# Patient Record
Sex: MALE | Race: BLACK OR AFRICAN AMERICAN | NOT HISPANIC OR LATINO | Employment: FULL TIME | URBAN - METROPOLITAN AREA
[De-identification: names, ages, dates, MRNs, and addresses within clinical notes are randomized per-mention and may not be internally consistent; named-entity substitution may affect disease eponyms.]

---

## 2020-10-02 ENCOUNTER — OFFICE VISIT (OUTPATIENT)
Dept: FAMILY MEDICINE CLINIC | Facility: CLINIC | Age: 25
End: 2020-10-02

## 2020-10-02 VITALS
SYSTOLIC BLOOD PRESSURE: 130 MMHG | HEIGHT: 71 IN | TEMPERATURE: 97.5 F | DIASTOLIC BLOOD PRESSURE: 86 MMHG | HEART RATE: 82 BPM | OXYGEN SATURATION: 99 % | WEIGHT: 250.6 LBS | BODY MASS INDEX: 35.08 KG/M2

## 2020-10-02 DIAGNOSIS — B36.0 TINEA VERSICOLOR: Primary | ICD-10-CM

## 2020-10-02 PROCEDURE — 99202 OFFICE O/P NEW SF 15 MIN: CPT | Performed by: FAMILY MEDICINE

## 2020-10-02 PROCEDURE — 90471 IMMUNIZATION ADMIN: CPT | Performed by: FAMILY MEDICINE

## 2020-10-02 PROCEDURE — 90686 IIV4 VACC NO PRSV 0.5 ML IM: CPT | Performed by: FAMILY MEDICINE

## 2020-10-02 RX ORDER — CLOTRIMAZOLE 1 %
CREAM (GRAM) TOPICAL 2 TIMES DAILY
Qty: 60 G | Refills: 5 | Status: SHIPPED | OUTPATIENT
Start: 2020-10-02 | End: 2022-04-19

## 2020-10-02 NOTE — PROGRESS NOTES
"Subjective   Bharathi Funez is a 25 y.o. male.     Chief Complaint   Patient presents with   • Establish Care   • skin discoloration center of chest       History of Present Illness   Has skin hypopigmentation on chest in neck, spreading, for years. Itchy.     Dad thought he had a hearing problem. Has had neg hearing eval. Pt thinks he may just have trouble processing things. Pt worried he has adhd. Has some trouble focusing. Frequently looses things. Does not avoid mental effort. Sometimes wont complete tasks.     The following portions of the patient's history were reviewed and updated as appropriate: allergies, current medications, past family history, past medical history, past social history, past surgical history and problem list.    History reviewed. No pertinent past medical history.    Past Surgical History:   Procedure Laterality Date   • DENTAL TRAUMA REPAIR (TOOTH REIMPLANTATION)         Family History   Problem Relation Age of Onset   • Diabetes Maternal Uncle        Social History     Socioeconomic History   • Marital status: Single     Spouse name: Not on file   • Number of children: Not on file   • Years of education: Not on file   • Highest education level: Not on file   Tobacco Use   • Smoking status: Never Smoker   • Smokeless tobacco: Never Used   Substance and Sexual Activity   • Alcohol use: Never     Frequency: Never   • Drug use: Never       Review of Systems   Constitutional: Negative for fever.   Respiratory: Negative for shortness of breath.        Objective   Visit Vitals  /86   Pulse 82   Temp 97.5 °F (36.4 °C) (Temporal)   Ht 180.3 cm (71\")   Wt 114 kg (250 lb 9.6 oz)   SpO2 99%   BMI 34.95 kg/m²     Body mass index is 34.95 kg/m².  Physical Exam  Constitutional:       Appearance: Normal appearance. He is well-developed.   Cardiovascular:      Rate and Rhythm: Normal rate and regular rhythm.      Heart sounds: Normal heart sounds.   Pulmonary:      Effort: Pulmonary effort is " normal.      Breath sounds: Normal breath sounds.   Musculoskeletal: Normal range of motion.         General: No swelling.   Skin:     General: Skin is warm and dry.      Findings: No rash.   Neurological:      General: No focal deficit present.      Mental Status: He is alert and oriented to person, place, and time.   Psychiatric:         Mood and Affect: Mood normal.         Behavior: Behavior normal.           Assessment/Plan   Bharathi was seen today for establish care and skin discoloration center of chest.    Diagnoses and all orders for this visit:    Tinea versicolor  -     clotrimazole (LOTRIMIN) 1 % cream; Apply  topically to the appropriate area as directed 2 (Two) Times a Day. For 2-4 weeks.    Other orders  -     Fluarix/Fluzone/Afluria Quad>6 Months        F/u if worse or no better. Gave resources for psych appt.

## 2021-06-15 ENCOUNTER — OFFICE VISIT (OUTPATIENT)
Dept: FAMILY MEDICINE CLINIC | Facility: CLINIC | Age: 26
End: 2021-06-15

## 2021-06-15 VITALS
DIASTOLIC BLOOD PRESSURE: 62 MMHG | HEIGHT: 71 IN | SYSTOLIC BLOOD PRESSURE: 104 MMHG | BODY MASS INDEX: 33.63 KG/M2 | WEIGHT: 240.2 LBS | HEART RATE: 78 BPM | TEMPERATURE: 97.8 F | OXYGEN SATURATION: 97 %

## 2021-06-15 DIAGNOSIS — R35.0 URINARY FREQUENCY: Primary | ICD-10-CM

## 2021-06-15 PROCEDURE — 99214 OFFICE O/P EST MOD 30 MIN: CPT | Performed by: FAMILY MEDICINE

## 2021-06-15 NOTE — PROGRESS NOTES
"Subjective   Bharathi Funez is a 25 y.o. male.     Chief Complaint   Patient presents with   • Urinary Frequency   • Abdominal Pain     left side        History of Present Illness   Pt had some mild LUQ pain with gas last week. It is resolved now.     Has had urinary frequency for a year. Does drink a lot of water. Using the restroom every 2-3 hours. Having trouble getting to break time at work. No dyuria. No unprotected sex. No hematuria. Does not have thirst.     Working on weight loss.     The following portions of the patient's history were reviewed and updated as appropriate: allergies, current medications, past family history, past medical history, past social history, past surgical history and problem list.    History reviewed. No pertinent past medical history.    Past Surgical History:   Procedure Laterality Date   • DENTAL TRAUMA REPAIR (TOOTH REIMPLANTATION)         Family History   Problem Relation Age of Onset   • Diabetes Maternal Uncle        Social History     Socioeconomic History   • Marital status: Single     Spouse name: Not on file   • Number of children: Not on file   • Years of education: Not on file   • Highest education level: Not on file   Tobacco Use   • Smoking status: Never Smoker   • Smokeless tobacco: Never Used   Substance and Sexual Activity   • Alcohol use: Never   • Drug use: Never       Review of Systems   Constitutional: Negative for fever.       Objective   Visit Vitals  /62 (BP Location: Left arm, Patient Position: Sitting)   Pulse 78   Temp 97.8 °F (36.6 °C)   Ht 180.3 cm (70.98\")   Wt 109 kg (240 lb 3.2 oz)   SpO2 97%   BMI 33.52 kg/m²     Body mass index is 33.52 kg/m².  Physical Exam  Constitutional:       Appearance: Normal appearance. He is well-developed.   Cardiovascular:      Rate and Rhythm: Normal rate and regular rhythm.      Heart sounds: Normal heart sounds.   Pulmonary:      Effort: Pulmonary effort is normal.      Breath sounds: Normal breath sounds. "   Abdominal:      General: Bowel sounds are normal. There is no distension.      Tenderness: There is no abdominal tenderness. There is no guarding or rebound.   Musculoskeletal:         General: No swelling. Normal range of motion.   Skin:     General: Skin is warm and dry.      Findings: No rash.   Neurological:      General: No focal deficit present.      Mental Status: He is alert and oriented to person, place, and time.   Psychiatric:         Mood and Affect: Mood normal.         Behavior: Behavior normal.           Assessment/Plan   Diagnoses and all orders for this visit:    1. Urinary frequency (Primary)  -     CBC & Differential  -     Comprehensive Metabolic Panel  -     Urinalysis With Microscopic - Urine, Clean Catch  -     Urine Culture - Urine, Urine, Clean Catch  -     Hemoglobin A1c          Will refer based on results.

## 2021-06-17 LAB
ALBUMIN SERPL-MCNC: 4.4 G/DL (ref 3.5–5.2)
ALBUMIN/GLOB SERPL: 1.6 G/DL
ALP SERPL-CCNC: 83 U/L (ref 39–117)
ALT SERPL-CCNC: 18 U/L (ref 1–41)
APPEARANCE UR: CLEAR
AST SERPL-CCNC: 19 U/L (ref 1–40)
BACTERIA #/AREA URNS HPF: NORMAL /HPF
BACTERIA UR CULT: NO GROWTH
BACTERIA UR CULT: NORMAL
BASOPHILS # BLD AUTO: 0.04 10*3/MM3 (ref 0–0.2)
BASOPHILS NFR BLD AUTO: 1.1 % (ref 0–1.5)
BILIRUB SERPL-MCNC: 0.2 MG/DL (ref 0–1.2)
BILIRUB UR QL STRIP: NEGATIVE
BUN SERPL-MCNC: 12 MG/DL (ref 6–20)
BUN/CREAT SERPL: 11.3 (ref 7–25)
CALCIUM SERPL-MCNC: 9.5 MG/DL (ref 8.6–10.5)
CASTS URNS MICRO: NORMAL
CHLORIDE SERPL-SCNC: 102 MMOL/L (ref 98–107)
CO2 SERPL-SCNC: 28.6 MMOL/L (ref 22–29)
COLOR UR: YELLOW
CREAT SERPL-MCNC: 1.06 MG/DL (ref 0.76–1.27)
EOSINOPHIL # BLD AUTO: 0.11 10*3/MM3 (ref 0–0.4)
EOSINOPHIL NFR BLD AUTO: 2.9 % (ref 0.3–6.2)
EPI CELLS #/AREA URNS HPF: NORMAL /HPF
ERYTHROCYTE [DISTWIDTH] IN BLOOD BY AUTOMATED COUNT: 12.7 % (ref 12.3–15.4)
GLOBULIN SER CALC-MCNC: 2.7 GM/DL
GLUCOSE SERPL-MCNC: 78 MG/DL (ref 65–99)
GLUCOSE UR QL: NEGATIVE
HBA1C MFR BLD: 5.1 % (ref 4.8–5.6)
HCT VFR BLD AUTO: 42.5 % (ref 37.5–51)
HGB BLD-MCNC: 14.1 G/DL (ref 13–17.7)
HGB UR QL STRIP: NEGATIVE
IMM GRANULOCYTES # BLD AUTO: 0.01 10*3/MM3 (ref 0–0.05)
IMM GRANULOCYTES NFR BLD AUTO: 0.3 % (ref 0–0.5)
KETONES UR QL STRIP: ABNORMAL
LEUKOCYTE ESTERASE UR QL STRIP: NEGATIVE
LYMPHOCYTES # BLD AUTO: 1.43 10*3/MM3 (ref 0.7–3.1)
LYMPHOCYTES NFR BLD AUTO: 37.8 % (ref 19.6–45.3)
MCH RBC QN AUTO: 28.9 PG (ref 26.6–33)
MCHC RBC AUTO-ENTMCNC: 33.2 G/DL (ref 31.5–35.7)
MCV RBC AUTO: 87.1 FL (ref 79–97)
MONOCYTES # BLD AUTO: 0.4 10*3/MM3 (ref 0.1–0.9)
MONOCYTES NFR BLD AUTO: 10.6 % (ref 5–12)
NEUTROPHILS # BLD AUTO: 1.79 10*3/MM3 (ref 1.7–7)
NEUTROPHILS NFR BLD AUTO: 47.3 % (ref 42.7–76)
NITRITE UR QL STRIP: NEGATIVE
NRBC BLD AUTO-RTO: 0 /100 WBC (ref 0–0.2)
PH UR STRIP: 6 [PH] (ref 5–8)
PLATELET # BLD AUTO: 298 10*3/MM3 (ref 140–450)
POTASSIUM SERPL-SCNC: 4.5 MMOL/L (ref 3.5–5.2)
PROT SERPL-MCNC: 7.1 G/DL (ref 6–8.5)
PROT UR QL STRIP: NEGATIVE
RBC # BLD AUTO: 4.88 10*6/MM3 (ref 4.14–5.8)
RBC #/AREA URNS HPF: NORMAL /HPF
SODIUM SERPL-SCNC: 140 MMOL/L (ref 136–145)
SP GR UR: 1.03 (ref 1–1.03)
UROBILINOGEN UR STRIP-MCNC: ABNORMAL MG/DL
WBC # BLD AUTO: 3.78 10*3/MM3 (ref 3.4–10.8)
WBC #/AREA URNS HPF: NORMAL /HPF

## 2021-06-18 DIAGNOSIS — R35.0 URINARY FREQUENCY: Primary | ICD-10-CM

## 2021-12-06 ENCOUNTER — OFFICE VISIT (OUTPATIENT)
Dept: FAMILY MEDICINE CLINIC | Facility: CLINIC | Age: 26
End: 2021-12-06

## 2021-12-06 VITALS
HEIGHT: 71 IN | BODY MASS INDEX: 29.93 KG/M2 | DIASTOLIC BLOOD PRESSURE: 80 MMHG | TEMPERATURE: 97.1 F | OXYGEN SATURATION: 95 % | WEIGHT: 213.8 LBS | HEART RATE: 78 BPM | SYSTOLIC BLOOD PRESSURE: 110 MMHG

## 2021-12-06 DIAGNOSIS — Z11.59 ENCOUNTER FOR HEPATITIS C SCREENING TEST FOR LOW RISK PATIENT: ICD-10-CM

## 2021-12-06 DIAGNOSIS — Z23 IMMUNIZATION DUE: ICD-10-CM

## 2021-12-06 DIAGNOSIS — R20.0 NUMBNESS AROUND MOUTH: ICD-10-CM

## 2021-12-06 DIAGNOSIS — Z00.00 HEALTHCARE MAINTENANCE: Primary | ICD-10-CM

## 2021-12-06 DIAGNOSIS — B37.0 THRUSH: ICD-10-CM

## 2021-12-06 PROCEDURE — 90471 IMMUNIZATION ADMIN: CPT | Performed by: FAMILY MEDICINE

## 2021-12-06 PROCEDURE — 99395 PREV VISIT EST AGE 18-39: CPT | Performed by: FAMILY MEDICINE

## 2021-12-06 PROCEDURE — 90715 TDAP VACCINE 7 YRS/> IM: CPT | Performed by: FAMILY MEDICINE

## 2021-12-06 RX ORDER — OMEPRAZOLE 20 MG/1
20 TABLET, DELAYED RELEASE ORAL DAILY
COMMUNITY
Start: 2021-10-30 | End: 2022-01-19

## 2021-12-06 NOTE — PROGRESS NOTES
Bharathi Funez is here today for an annual physical exam.     Pt had tingling in mouth after pulling a staple out of his cough with his teeth. He went to the dentist and was told his teeth were ok. Went to [facility] and and they told him it was gerd and gave him a PPI and this helped some. It then went away until he bit another staple to pull it out again. Then his symptoms returned. Went to urgent dental and told him his xrays were ok. Still having some intermittent numbness/burning. Has an appointment with omfs pending.     Eating a healthy diet. Exercising routinely.  Trying to loose weight and has lost 27 pounds!  Sexual and gender orientation: heterosexual male  Practicing safe sex?:yes    PHQ-2 Depression Screening  Little interest or pleasure in doing things? 0   Feeling down, depressed, or hopeless? 0   PHQ-2 Total Score 0        I have reviewed the patient's medical, family, and social history in detail and updated the computerized patient record.    Screening history:  Colonoscopy - not yet due  Prostate - no symptoms  Metabolic - due    Health Maintenance   Topic Date Due   • ANNUAL PHYSICAL  Never done   • TDAP/TD VACCINES (1 - Tdap) Never done   • HEPATITIS C SCREENING  Never done   • INFLUENZA VACCINE  08/01/2021   • COVID-19 Vaccine  Completed   • Pneumococcal Vaccine 0-64  Aged Out       Review of Systems   Constitutional: Negative for fever.   HENT: Negative for hearing loss.    Eyes: Negative for visual disturbance.   Respiratory: Negative for shortness of breath.    Cardiovascular: Negative for chest pain.   Gastrointestinal: Negative for constipation and diarrhea.   Endocrine: Negative for polyuria.   Genitourinary: Negative for difficulty urinating.   Musculoskeletal: Negative for arthralgias and myalgias.   Skin: Negative for rash.   Neurological: Negative for headaches.   Hematological: Does not bruise/bleed easily.   Psychiatric/Behavioral: Negative for dysphoric mood.       /80 (BP  "Location: Left arm, Patient Position: Sitting)   Pulse 78   Temp 97.1 °F (36.2 °C)   Ht 180.3 cm (70.98\")   Wt 97 kg (213 lb 12.8 oz)   SpO2 95%   BMI 29.83 kg/m²      Physical Exam    Vital signs reviewed.  General appearance: No acute distress  Eyes: conjunctiva clear without erythema; pupils equally round and reactive  ENT: external ears and nose normal; hearing normal, oropharynx clear  Neck: supple; no thyromegaly  CV: normal rate and rhythm; no peripheral edema  Respiratory: normal respiratory effort; lungs clear to auscultation bilaterally  MSK: normal gait and station; no focal joint deformity or swelling  Skin: no rash or wounds; normal turgor  Neuro: cranial nerves 2-12 grossly intact; normal sensation to light touch  Psych: mood and affect normal; recent and remote memory intact    No visits with results within 2 Week(s) from this visit.   Latest known visit with results is:   Office Visit on 06/15/2021   Component Date Value Ref Range Status   • WBC 06/15/2021 3.78  3.40 - 10.80 10*3/mm3 Final   • RBC 06/15/2021 4.88  4.14 - 5.80 10*6/mm3 Final   • Hemoglobin 06/15/2021 14.1  13.0 - 17.7 g/dL Final   • Hematocrit 06/15/2021 42.5  37.5 - 51.0 % Final   • MCV 06/15/2021 87.1  79.0 - 97.0 fL Final   • MCH 06/15/2021 28.9  26.6 - 33.0 pg Final   • MCHC 06/15/2021 33.2  31.5 - 35.7 g/dL Final   • RDW 06/15/2021 12.7  12.3 - 15.4 % Final   • Platelets 06/15/2021 298  140 - 450 10*3/mm3 Final   • Neutrophil Rel % 06/15/2021 47.3  42.7 - 76.0 % Final   • Lymphocyte Rel % 06/15/2021 37.8  19.6 - 45.3 % Final   • Monocyte Rel % 06/15/2021 10.6  5.0 - 12.0 % Final   • Eosinophil Rel % 06/15/2021 2.9  0.3 - 6.2 % Final   • Basophil Rel % 06/15/2021 1.1  0.0 - 1.5 % Final   • Neutrophils Absolute 06/15/2021 1.79  1.70 - 7.00 10*3/mm3 Final   • Lymphocytes Absolute 06/15/2021 1.43  0.70 - 3.10 10*3/mm3 Final   • Monocytes Absolute 06/15/2021 0.40  0.10 - 0.90 10*3/mm3 Final   • Eosinophils Absolute 06/15/2021 " 0.11  0.00 - 0.40 10*3/mm3 Final   • Basophils Absolute 06/15/2021 0.04  0.00 - 0.20 10*3/mm3 Final   • Immature Granulocyte Rel % 06/15/2021 0.3  0.0 - 0.5 % Final   • Immature Grans Absolute 06/15/2021 0.01  0.00 - 0.05 10*3/mm3 Final   • nRBC 06/15/2021 0.0  0.0 - 0.2 /100 WBC Final   • Glucose 06/15/2021 78  65 - 99 mg/dL Final   • BUN 06/15/2021 12  6 - 20 mg/dL Final   • Creatinine 06/15/2021 1.06  0.76 - 1.27 mg/dL Final   • eGFR Non  Am 06/15/2021 84  >60 mL/min/1.73 Final    Comment: GFR Normal >60  Chronic Kidney Disease <60  Kidney Failure <15     • eGFR  Am 06/15/2021 102  >60 mL/min/1.73 Final   • BUN/Creatinine Ratio 06/15/2021 11.3  7.0 - 25.0 Final   • Sodium 06/15/2021 140  136 - 145 mmol/L Final   • Potassium 06/15/2021 4.5  3.5 - 5.2 mmol/L Final   • Chloride 06/15/2021 102  98 - 107 mmol/L Final   • Total CO2 06/15/2021 28.6  22.0 - 29.0 mmol/L Final   • Calcium 06/15/2021 9.5  8.6 - 10.5 mg/dL Final   • Total Protein 06/15/2021 7.1  6.0 - 8.5 g/dL Final   • Albumin 06/15/2021 4.40  3.50 - 5.20 g/dL Final   • Globulin 06/15/2021 2.7  gm/dL Final   • A/G Ratio 06/15/2021 1.6  g/dL Final   • Total Bilirubin 06/15/2021 0.2  0.0 - 1.2 mg/dL Final   • Alkaline Phosphatase 06/15/2021 83  39 - 117 U/L Final   • AST (SGOT) 06/15/2021 19  1 - 40 U/L Final   • ALT (SGPT) 06/15/2021 18  1 - 41 U/L Final   • Specific Gravity, UA 06/15/2021 1.026  1.005 - 1.030 Final   • pH, UA 06/15/2021 6.0  5.0 - 8.0 Final   • Color, UA 06/15/2021 Yellow   Final    REFERENCE RANGE: Yellow, Straw   • Appearance, UA 06/15/2021 Clear  Clear Final   • Leukocytes, UA 06/15/2021 Negative  Negative Final   • Protein 06/15/2021 Negative  Negative Final   • Glucose, UA 06/15/2021 Negative  Negative Final   • Ketones 06/15/2021 Trace* Negative Final   • Blood, UA 06/15/2021 Negative  Negative Final   • Bilirubin, UA 06/15/2021 Negative  Negative Final   • Urobilinogen, UA 06/15/2021 Comment   Final    Comment: 0.2  E.U./dL  REFERENCE RANGE: 0.2 - 1.0 E.U./dL     • Nitrite, UA 06/15/2021 Negative  Negative Final   • Urine Culture 06/15/2021 Final report   Final   • Result 1 06/15/2021 No growth   Final   • Hemoglobin A1C 06/15/2021 5.10  4.80 - 5.60 % Final    Comment: Hemoglobin A1C Ranges:  Increased Risk for Diabetes  5.7% to 6.4%  Diabetes                     >= 6.5%  Diabetic Goal                < 7.0%     • WBC, UA 06/15/2021 0-2  /HPF Final    REFERENCE RANGE: None Seen, 0-2   • RBC, UA 06/15/2021 0-2  /HPF Final    REFERENCE RANGE: None Seen, 0-2   • Epithelial Cells (non renal) 06/15/2021 0-2  /HPF Final    REFERENCE RANGE: None Seen, 0-2   • Cast Type 06/15/2021 Comment   Final    HYALINE CASTS, UA            0-2              /LPF       None Seen  N   • Bacteria, UA 06/15/2021 Comment  None Seen /HPF Final    None Seen          Current Outpatient Medications:   •  clotrimazole (LOTRIMIN) 1 % cream, Apply  topically to the appropriate area as directed 2 (Two) Times a Day. For 2-4 weeks., Disp: 60 g, Rfl: 5  •  nystatin (MYCOSTATIN) 100,000 unit/mL suspension, Swish and swallow 5 mL 4 (Four) Times a Day., Disp: 200 mL, Rfl: 0  •  omeprazole OTC (PriLOSEC OTC) 20 MG EC tablet, Take 20 mg by mouth Daily., Disp: , Rfl:     Diagnoses and all orders for this visit:    1. Healthcare maintenance (Primary)  -     Comprehensive Metabolic Panel  -     Lipid Panel  -     Hemoglobin A1c    2. Numbness around mouth  -     Vitamin B12  -     TSH Rfx On Abnormal To Free T4  -     Hemoglobin A1c    3. Encounter for hepatitis C screening test for low risk patient  -     HCV Antibody Reflex To [facility]    4. Immunization due  -     Tdap Vaccine Greater Than or Equal To 6yo IM    5. Thrush  -     nystatin (MYCOSTATIN) 100,000 unit/mL suspension; Swish and swallow 5 mL 4 (Four) Times a Day.  Dispense: 200 mL; Refill: 0        Encourage healthy diet and exercise.  Encourage patient to stay up to date on screening examinations as indicated based  on age and risk factors. F/U yearly.     Patient did not have any obvious signs of thrush in his mouth but his symptoms could be consistent so we will go ahead and treat and follow-up with [facility].

## 2021-12-07 LAB
ALBUMIN SERPL-MCNC: 4.6 G/DL (ref 4.1–5.2)
ALBUMIN/GLOB SERPL: 1.8 {RATIO} (ref 1.2–2.2)
ALP SERPL-CCNC: 65 IU/L (ref 44–121)
ALT SERPL-CCNC: 17 IU/L (ref 0–44)
AST SERPL-CCNC: 17 IU/L (ref 0–40)
BILIRUB SERPL-MCNC: 0.6 MG/DL (ref 0–1.2)
BUN SERPL-MCNC: 9 MG/DL (ref 6–20)
BUN/CREAT SERPL: 8 (ref 9–20)
CALCIUM SERPL-MCNC: 10.4 MG/DL (ref 8.7–10.2)
CHLORIDE SERPL-SCNC: 99 MMOL/L (ref 96–106)
CHOLEST SERPL-MCNC: 121 MG/DL (ref 100–199)
CO2 SERPL-SCNC: 26 MMOL/L (ref 20–29)
CREAT SERPL-MCNC: 1.11 MG/DL (ref 0.76–1.27)
GLOBULIN SER CALC-MCNC: 2.6 G/DL (ref 1.5–4.5)
GLUCOSE SERPL-MCNC: 76 MG/DL (ref 65–99)
HBA1C MFR BLD: 5 % (ref 4.8–5.6)
HCV AB S/CO SERPL IA: <0.1 S/CO RATIO (ref 0–0.9)
HCV AB SERPL QL IA: NORMAL
HDLC SERPL-MCNC: 39 MG/DL
LDLC SERPL CALC-MCNC: 70 MG/DL (ref 0–99)
POTASSIUM SERPL-SCNC: 4.3 MMOL/L (ref 3.5–5.2)
PROT SERPL-MCNC: 7.2 G/DL (ref 6–8.5)
SODIUM SERPL-SCNC: 138 MMOL/L (ref 134–144)
TRIGL SERPL-MCNC: 55 MG/DL (ref 0–149)
TSH SERPL DL<=0.005 MIU/L-ACNC: 2.96 UIU/ML (ref 0.45–4.5)
VIT B12 SERPL-MCNC: 1587 PG/ML (ref 232–1245)
VLDLC SERPL CALC-MCNC: 12 MG/DL (ref 5–40)

## 2022-01-11 ENCOUNTER — TELEPHONE (OUTPATIENT)
Dept: FAMILY MEDICINE CLINIC | Facility: CLINIC | Age: 27
End: 2022-01-11

## 2022-01-11 NOTE — TELEPHONE ENCOUNTER
Caller: Bharathi Funez    Relationship: Self    Best call back number: [phone]    What is the medical concern/diagnosis: HEART BURN, DIGESTIVE ISSUES    What specialty or service is being requested: GASTRO        Any additional details: PATIENT IS SURE WHERE HE WANTS TO GO. HE JUST WANTS TO RULE OUT ANYTHING WITH HIS ACID REFLUX

## 2022-01-19 ENCOUNTER — OFFICE VISIT (OUTPATIENT)
Dept: FAMILY MEDICINE CLINIC | Facility: CLINIC | Age: 27
End: 2022-01-19

## 2022-01-19 VITALS
TEMPERATURE: 98.7 F | HEIGHT: 71 IN | HEART RATE: 79 BPM | BODY MASS INDEX: 29.83 KG/M2 | DIASTOLIC BLOOD PRESSURE: 86 MMHG | SYSTOLIC BLOOD PRESSURE: 118 MMHG | OXYGEN SATURATION: 98 %

## 2022-01-19 DIAGNOSIS — K21.9 GASTROESOPHAGEAL REFLUX DISEASE WITHOUT ESOPHAGITIS: Primary | ICD-10-CM

## 2022-01-19 DIAGNOSIS — R10.13 EPIGASTRIC PAIN: ICD-10-CM

## 2022-01-19 PROCEDURE — 99214 OFFICE O/P EST MOD 30 MIN: CPT | Performed by: FAMILY MEDICINE

## 2022-01-19 RX ORDER — OMEPRAZOLE 40 MG/1
40 CAPSULE, DELAYED RELEASE ORAL DAILY
Qty: 30 CAPSULE | Refills: 5 | Status: SHIPPED | OUTPATIENT
Start: 2022-01-19

## 2022-01-19 NOTE — PROGRESS NOTES
"Subjective   Bharathi Funez is a 26 y.o. male.     Chief Complaint   Patient presents with   • referral     GI       History of Present Illness   nausea for 2 weeks, epigastric discomfort and burning. Having reflux symptoms. Having sour taste in mouth and belching. Is taking his ppi. Asking for GI referral. Spicy food makes it worse. Unsure about fatty foods. Burning mouth has resolved.     The following portions of the patient's history were reviewed and updated as appropriate: allergies, current medications, past family history, past medical history, past social history, past surgical history and problem list.    History reviewed. No pertinent past medical history.    Past Surgical History:   Procedure Laterality Date   • DENTAL TRAUMA REPAIR (TOOTH REIMPLANTATION)         Family History   Problem Relation Age of Onset   • Diabetes Maternal Uncle        Social History     Socioeconomic History   • Marital status: Single   Tobacco Use   • Smoking status: Never Smoker   • Smokeless tobacco: Never Used   Substance and Sexual Activity   • Alcohol use: Never   • Drug use: Never       Review of Systems   Constitutional: Negative for fever.   Gastrointestinal: Positive for GERD and indigestion. Negative for anal bleeding, blood in stool, constipation and vomiting.       Objective   Visit Vitals  /86 (BP Location: Left arm, Patient Position: Sitting)   Pulse 79   Temp 98.7 °F (37.1 °C)   Ht 180.3 cm (70.98\")   SpO2 98%   BMI 29.83 kg/m²     Body mass index is 29.83 kg/m².  Physical Exam  Constitutional:       Appearance: Normal appearance. He is well-developed.   Cardiovascular:      Rate and Rhythm: Normal rate and regular rhythm.      Heart sounds: Normal heart sounds.   Pulmonary:      Effort: Pulmonary effort is normal.      Breath sounds: Normal breath sounds.   Abdominal:      General: Abdomen is flat. Bowel sounds are normal. There is no distension.      Tenderness: There is no abdominal tenderness. There is " no guarding.   Musculoskeletal:         General: No swelling. Normal range of motion.   Skin:     General: Skin is warm and dry.      Findings: No rash.   Neurological:      General: No focal deficit present.      Mental Status: He is alert and oriented to person, place, and time.   Psychiatric:         Mood and Affect: Mood normal.         Behavior: Behavior normal.           Assessment/Plan   Diagnoses and all orders for this visit:    1. Gastroesophageal reflux disease without esophagitis (Primary)  -     Ambulatory Referral to Gastroenterology  -     omeprazole (priLOSEC) 40 MG capsule; Take 1 capsule by mouth Daily.  Dispense: 30 capsule; Refill: 5    2. Epigastric pain  -     CBC & Differential  -     Comprehensive Metabolic Panel  -     Amylase  -     Lipase        F/U if worse or no better.

## 2022-01-20 LAB
ALBUMIN SERPL-MCNC: 4.5 G/DL (ref 4.1–5.2)
ALBUMIN/GLOB SERPL: 1.5 {RATIO} (ref 1.2–2.2)
ALP SERPL-CCNC: 58 IU/L (ref 44–121)
ALT SERPL-CCNC: 12 IU/L (ref 0–44)
AMYLASE SERPL-CCNC: 55 U/L (ref 31–110)
AST SERPL-CCNC: 15 IU/L (ref 0–40)
BASOPHILS # BLD AUTO: 0.1 X10E3/UL (ref 0–0.2)
BASOPHILS NFR BLD AUTO: 2 %
BILIRUB SERPL-MCNC: 0.6 MG/DL (ref 0–1.2)
BUN SERPL-MCNC: 14 MG/DL (ref 6–20)
BUN/CREAT SERPL: 12 (ref 9–20)
CALCIUM SERPL-MCNC: 10.2 MG/DL (ref 8.7–10.2)
CHLORIDE SERPL-SCNC: 99 MMOL/L (ref 96–106)
CO2 SERPL-SCNC: 27 MMOL/L (ref 20–29)
CREAT SERPL-MCNC: 1.21 MG/DL (ref 0.76–1.27)
EOSINOPHIL # BLD AUTO: 0.2 X10E3/UL (ref 0–0.4)
EOSINOPHIL NFR BLD AUTO: 4 %
ERYTHROCYTE [DISTWIDTH] IN BLOOD BY AUTOMATED COUNT: 12.7 % (ref 11.6–15.4)
GLOBULIN SER CALC-MCNC: 3 G/DL (ref 1.5–4.5)
GLUCOSE SERPL-MCNC: 83 MG/DL (ref 65–99)
HCT VFR BLD AUTO: 43.8 % (ref 37.5–51)
HGB BLD-MCNC: 14.5 G/DL (ref 13–17.7)
IMM GRANULOCYTES # BLD AUTO: 0 X10E3/UL (ref 0–0.1)
IMM GRANULOCYTES NFR BLD AUTO: 0 %
LIPASE SERPL-CCNC: 26 U/L (ref 13–78)
LYMPHOCYTES # BLD AUTO: 1.5 X10E3/UL (ref 0.7–3.1)
LYMPHOCYTES NFR BLD AUTO: 45 %
MCH RBC QN AUTO: 28.9 PG (ref 26.6–33)
MCHC RBC AUTO-ENTMCNC: 33.1 G/DL (ref 31.5–35.7)
MCV RBC AUTO: 87 FL (ref 79–97)
MONOCYTES # BLD AUTO: 0.4 X10E3/UL (ref 0.1–0.9)
MONOCYTES NFR BLD AUTO: 11 %
NEUTROPHILS # BLD AUTO: 1.3 X10E3/UL (ref 1.4–7)
NEUTROPHILS NFR BLD AUTO: 38 %
PLATELET # BLD AUTO: 339 X10E3/UL (ref 150–450)
POTASSIUM SERPL-SCNC: 4.1 MMOL/L (ref 3.5–5.2)
PROT SERPL-MCNC: 7.5 G/DL (ref 6–8.5)
RBC # BLD AUTO: 5.02 X10E6/UL (ref 4.14–5.8)
SODIUM SERPL-SCNC: 140 MMOL/L (ref 134–144)
WBC # BLD AUTO: 3.4 X10E3/UL (ref 3.4–10.8)

## 2022-03-08 ENCOUNTER — PREP FOR SURGERY (OUTPATIENT)
Dept: SURGERY | Facility: SURGERY CENTER | Age: 27
End: 2022-03-08

## 2022-03-08 ENCOUNTER — OFFICE VISIT (OUTPATIENT)
Dept: GASTROENTEROLOGY | Facility: CLINIC | Age: 27
End: 2022-03-08

## 2022-03-08 VITALS
BODY MASS INDEX: 27.48 KG/M2 | HEART RATE: 73 BPM | OXYGEN SATURATION: 98 % | DIASTOLIC BLOOD PRESSURE: 74 MMHG | TEMPERATURE: 96.6 F | HEIGHT: 71 IN | WEIGHT: 196.3 LBS | SYSTOLIC BLOOD PRESSURE: 128 MMHG

## 2022-03-08 DIAGNOSIS — R10.13 EPIGASTRIC PAIN: ICD-10-CM

## 2022-03-08 DIAGNOSIS — R11.0 NAUSEA: ICD-10-CM

## 2022-03-08 DIAGNOSIS — K21.9 GASTROESOPHAGEAL REFLUX DISEASE, UNSPECIFIED WHETHER ESOPHAGITIS PRESENT: Primary | ICD-10-CM

## 2022-03-08 DIAGNOSIS — R19.7 DIARRHEA, UNSPECIFIED TYPE: ICD-10-CM

## 2022-03-08 PROCEDURE — 99204 OFFICE O/P NEW MOD 45 MIN: CPT | Performed by: INTERNAL MEDICINE

## 2022-03-08 RX ORDER — SODIUM CHLORIDE, SODIUM LACTATE, POTASSIUM CHLORIDE, CALCIUM CHLORIDE 600; 310; 30; 20 MG/100ML; MG/100ML; MG/100ML; MG/100ML
30 INJECTION, SOLUTION INTRAVENOUS CONTINUOUS PRN
Status: CANCELLED | OUTPATIENT
Start: 2022-03-08

## 2022-03-08 RX ORDER — SODIUM CHLORIDE 0.9 % (FLUSH) 0.9 %
3 SYRINGE (ML) INJECTION EVERY 12 HOURS SCHEDULED
Status: CANCELLED | OUTPATIENT
Start: 2022-03-08

## 2022-03-08 RX ORDER — SODIUM CHLORIDE 0.9 % (FLUSH) 0.9 %
10 SYRINGE (ML) INJECTION AS NEEDED
Status: CANCELLED | OUTPATIENT
Start: 2022-03-08

## 2022-03-08 NOTE — PROGRESS NOTES
"Chief Complaint   Patient presents with   • Abdominal Pain   • Diarrhea           History of Present Illness  Patient today for consultation regarding chronic and worsening GERD.  He currently takes Prilosec 40 mg daily.  Is also having nausea and epigastric burning pain.    Pain and heat is present in upper chest abdomen, right and left side, comes and goes.     He thought symptoms were initially related to his mouth/dental issues. He had heat sensation in the back of his throat. He has nausea, no vomiting.     Prescription strength omeprazole has been working better than OTC dose.     Periods of diarrhea last 2 weeks, come and go then return to normal.     No family history of inflammatory bowel disease.     He has had some Weight loss unsure if related to recent dental issues.        Result Review :       Office Visit with Lois Bishop MD (01/19/2022)  SCANNED - IMAGING (07/16/2021)  Comprehensive Metabolic Panel (01/19/2022 10:55)  CBC & Differential (01/19/2022 10:55)      Vital Signs:   /74   Pulse 73   Temp 96.6 °F (35.9 °C)   Ht 180.3 cm (71\")   Wt 89 kg (196 lb 4.8 oz)   SpO2 98%   BMI 27.38 kg/m²     Body mass index is 27.38 kg/m².     Physical Exam  Vitals reviewed.   Constitutional:       Appearance: Normal appearance.   Abdominal:      General: Bowel sounds are normal. There is no distension.      Palpations: Abdomen is soft. Abdomen is not rigid. There is no mass or pulsatile mass.      Tenderness: There is no abdominal tenderness. There is no guarding or rebound.           Assessment and Plan    Diagnoses and all orders for this visit:    1. Gastroesophageal reflux disease, unspecified whether esophagitis present (Primary)    2. Epigastric pain    3. Nausea    4. Diarrhea, unspecified type         BRIEF SUMMARY  Patient for consultation.  He complains of new and unexplained epigastric pain with worsening reflux.  He also has had nonspecified nausea.  We will proceed with an EGD for " further evaluation.  He also has had unexplained new diarrhea and we will proceed with colonoscopy to rule out inflammatory bowel issues.    I have reviewed and confirmed the accuracy of the HPI and Assessment and Plan as documented by the MO[facility]        Follow Up   No follow-ups on file.    Patient Instructions   Continue omeprazole    For GERD, follow antireflux precautions.  Recommend avoiding eating within 3 to 4 hours of bedtime.  Avoid foods that can trigger symptoms which may include citrus fruits, spicy foods, tomatoes and red sauces, chocolate, coffee/tea, caffeinated or carbonated beverages, alcohol.     Schedule EGD and colonoscopy, orders placed.    Additional recommendations will be made based on results of EGD and colonoscopy findings.    Follow-up visit after procedures to discuss results and make any additional recommendations.

## 2022-03-08 NOTE — PATIENT INSTRUCTIONS
Continue omeprazole    For GERD, follow antireflux precautions.  Recommend avoiding eating within 3 to 4 hours of bedtime.  Avoid foods that can trigger symptoms which may include citrus fruits, spicy foods, tomatoes and red sauces, chocolate, coffee/tea, caffeinated or carbonated beverages, alcohol.     Schedule EGD and colonoscopy, orders placed.    Additional recommendations will be made based on results of EGD and colonoscopy findings.    Follow-up visit after procedures to discuss results and make any additional recommendations.

## 2022-04-19 ENCOUNTER — LAB (OUTPATIENT)
Dept: LAB | Facility: SURGERY CENTER | Age: 27
End: 2022-04-19

## 2022-04-19 DIAGNOSIS — R10.13 EPIGASTRIC PAIN: ICD-10-CM

## 2022-04-19 DIAGNOSIS — R19.7 DIARRHEA, UNSPECIFIED TYPE: ICD-10-CM

## 2022-04-19 DIAGNOSIS — R11.0 NAUSEA: ICD-10-CM

## 2022-04-19 DIAGNOSIS — K21.9 GASTROESOPHAGEAL REFLUX DISEASE, UNSPECIFIED WHETHER ESOPHAGITIS PRESENT: ICD-10-CM

## 2022-04-19 LAB — SARS-COV-2 ORF1AB RESP QL NAA+PROBE: NOT DETECTED

## 2022-04-19 PROCEDURE — U0004 COV-19 TEST NON-CDC HGH THRU: HCPCS | Performed by: NURSE PRACTITIONER

## 2022-04-19 PROCEDURE — C9803 HOPD COVID-19 SPEC COLLECT: HCPCS

## 2022-04-19 NOTE — SIGNIFICANT NOTE
Education provided the Patient on the following:    - Nothing to Eat or Drink after MN the night before the procedure  -Your required COVID Test is Scheduled on 4/19/22 Between the Hours of 7080-7544  -You will only be notified if your COVID test Result is POSITIVE  -The importance of reducing your number of contacts by self quarantining after you COVID test until the date of your EGD  -You will need to have someone drive you home after your EGD and remain with you for 24 hours after the EGD  - The date of your EGD, your are welcome to have one visitor at bedside or remain within 10-15 minutes of [place]  -Please wear warm socks when you arrive for your EGD  -Remove all jewelry and leave any valuables before arriving on the date of your procedure (all will have to be removed before leaving preop)  -You will need to arrive at 1130 on 4/21/22 EGD  -Feel free to contact us at: [phone] with any additional questions/concerns       Education provided the Patient on the following:    - Nothing to Eat or Drink after MN the night before the procedure    - Avoid red/purple fluids while completing their bowel prep as ordered by physician  -Contact Gastrointerologist office for any questions about specific details regarding colon prep    -You will need to have someone drive you home after your colonoscopy and remain with you for 24 hours after the procedure  - The date of your Surgery, you may have one visitor at bedside or within 10-15 minutes of [place]  -Please wear warm socks when you arrive for your colonoscopy  -Remove all jewelry and leave any valuables before arriving the day of your procedure (all will have to be removed before leaving preop)  -You will need to arrive at 1130 on 4/21/22 for your colonoscopy    -Feel free to contact us at: [phone] with any additional questions/concerns

## 2022-04-21 ENCOUNTER — ANESTHESIA (OUTPATIENT)
Dept: SURGERY | Facility: SURGERY CENTER | Age: 27
End: 2022-04-21

## 2022-04-21 ENCOUNTER — ANESTHESIA EVENT (OUTPATIENT)
Dept: SURGERY | Facility: SURGERY CENTER | Age: 27
End: 2022-04-21

## 2022-04-21 ENCOUNTER — HOSPITAL ENCOUNTER (OUTPATIENT)
Facility: SURGERY CENTER | Age: 27
Setting detail: HOSPITAL OUTPATIENT SURGERY
Discharge: HOME | End: 2022-04-21
Attending: INTERNAL MEDICINE | Admitting: INTERNAL MEDICINE

## 2022-04-21 VITALS
OXYGEN SATURATION: 100 % | DIASTOLIC BLOOD PRESSURE: 62 MMHG | TEMPERATURE: 98 F | BODY MASS INDEX: 28.42 KG/M2 | SYSTOLIC BLOOD PRESSURE: 109 MMHG | WEIGHT: 203 LBS | RESPIRATION RATE: 16 BRPM | HEART RATE: 69 BPM | HEIGHT: 71 IN

## 2022-04-21 DIAGNOSIS — K21.9 GASTROESOPHAGEAL REFLUX DISEASE, UNSPECIFIED WHETHER ESOPHAGITIS PRESENT: ICD-10-CM

## 2022-04-21 DIAGNOSIS — R11.0 NAUSEA: ICD-10-CM

## 2022-04-21 DIAGNOSIS — R10.13 EPIGASTRIC PAIN: ICD-10-CM

## 2022-04-21 DIAGNOSIS — R19.7 DIARRHEA, UNSPECIFIED TYPE: ICD-10-CM

## 2022-04-21 PROCEDURE — 45380 COLONOSCOPY AND BIOPSY: CPT | Performed by: INTERNAL MEDICINE

## 2022-04-21 PROCEDURE — 88305 TISSUE EXAM BY PATHOLOGIST: CPT | Performed by: INTERNAL MEDICINE

## 2022-04-21 PROCEDURE — 43239 EGD BIOPSY SINGLE/MULTIPLE: CPT | Performed by: INTERNAL MEDICINE

## 2022-04-21 PROCEDURE — 25010000002 PROPOFOL 10 MG/ML EMULSION: Performed by: ANESTHESIOLOGY

## 2022-04-21 RX ORDER — LIDOCAINE HYDROCHLORIDE 10 MG/ML
0.5 INJECTION, SOLUTION INFILTRATION; PERINEURAL ONCE AS NEEDED
Status: DISCONTINUED | OUTPATIENT
Start: 2022-04-21 | End: 2022-04-21 | Stop reason: HOSPADM

## 2022-04-21 RX ORDER — LIDOCAINE HYDROCHLORIDE 20 MG/ML
INJECTION, SOLUTION INFILTRATION; PERINEURAL AS NEEDED
Status: DISCONTINUED | OUTPATIENT
Start: 2022-04-21 | End: 2022-04-21 | Stop reason: SURG

## 2022-04-21 RX ORDER — MAGNESIUM HYDROXIDE 1200 MG/15ML
LIQUID ORAL AS NEEDED
Status: DISCONTINUED | OUTPATIENT
Start: 2022-04-21 | End: 2022-04-21 | Stop reason: HOSPADM

## 2022-04-21 RX ORDER — SODIUM CHLORIDE 0.9 % (FLUSH) 0.9 %
10 SYRINGE (ML) INJECTION AS NEEDED
Status: DISCONTINUED | OUTPATIENT
Start: 2022-04-21 | End: 2022-04-21 | Stop reason: HOSPADM

## 2022-04-21 RX ORDER — SODIUM CHLORIDE, SODIUM LACTATE, POTASSIUM CHLORIDE, CALCIUM CHLORIDE 600; 310; 30; 20 MG/100ML; MG/100ML; MG/100ML; MG/100ML
1000 INJECTION, SOLUTION INTRAVENOUS CONTINUOUS
Status: DISCONTINUED | OUTPATIENT
Start: 2022-04-21 | End: 2022-04-21 | Stop reason: HOSPADM

## 2022-04-21 RX ORDER — PROPOFOL 10 MG/ML
VIAL (ML) INTRAVENOUS AS NEEDED
Status: DISCONTINUED | OUTPATIENT
Start: 2022-04-21 | End: 2022-04-21 | Stop reason: SURG

## 2022-04-21 RX ORDER — SODIUM CHLORIDE 0.9 % (FLUSH) 0.9 %
3 SYRINGE (ML) INJECTION EVERY 12 HOURS SCHEDULED
Status: DISCONTINUED | OUTPATIENT
Start: 2022-04-21 | End: 2022-04-21 | Stop reason: HOSPADM

## 2022-04-21 RX ADMIN — SODIUM CHLORIDE, POTASSIUM CHLORIDE, SODIUM LACTATE AND CALCIUM CHLORIDE 1000 ML: 600; 310; 30; 20 INJECTION, SOLUTION INTRAVENOUS at 12:01

## 2022-04-21 RX ADMIN — LIDOCAINE HYDROCHLORIDE 60 MG: 20 INJECTION, SOLUTION INFILTRATION; PERINEURAL at 13:28

## 2022-04-21 RX ADMIN — PROPOFOL 100 MG: 10 INJECTION, EMULSION INTRAVENOUS at 13:28

## 2022-04-21 RX ADMIN — PROPOFOL INJECTABLE EMULSION 160 MCG/KG/MIN: 10 INJECTION, EMULSION INTRAVENOUS at 13:28

## 2022-04-21 NOTE — ANESTHESIA POSTPROCEDURE EVALUATION
"Patient: Derek Funez    Procedure Summary     Date: 04/21/22 Room / Location: [facility]    Anesthesia Start: 1326 Anesthesia Stop: 1352    Procedures:       ESOPHAGOGASTRODUODENOSCOPY (N/A )      COLONOSCOPY (N/A ) Diagnosis:       Gastroesophageal reflux disease, unspecified whether esophagitis present      Epigastric pain      Nausea      Diarrhea, unspecified type      (Gastroesophageal reflux disease, unspecified whether esophagitis present [K21.9])      (Epigastric pain [R10.13])      (Nausea [R11.0])      (Diarrhea, unspecified type [R19.7])    Surgeons: Ketan Shea MD Provider: Sukhi Herzog MD    Anesthesia Type: MAC ASA Status: 2          Anesthesia Type: MAC    Vitals  Vitals Value Taken Time   /60 04/21/22 1351   Temp 36.7 °C (98 °F) 04/21/22 1351   Pulse 71 04/21/22 1351   Resp 16 04/21/22 1351   SpO2 99 % 04/21/22 1351           Post Anesthesia Care and Evaluation    Patient location during evaluation: bedside  Patient participation: complete - patient participated  Level of consciousness: awake and alert  Pain management: adequate  Airway patency: patent  Anesthetic complications: No anesthetic complications    Cardiovascular status: acceptable  Respiratory status: acceptable  Hydration status: acceptable    Comments: /60   Pulse 71   Temp 36.7 °C (98 °F)   Resp 16   Ht 180.3 cm (71\")   Wt 92.1 kg (203 lb)   SpO2 99%   BMI 28.31 kg/m²       "

## 2022-04-21 NOTE — H&P
No chief complaint on file.      HPI  Patient today for an EGD due to GERD and epigastric pain and nausea and a colonoscopy for diarrhea.  He does have a family history of inflammatory bowel disease.         Problem List:    Patient Active Problem List   Diagnosis   • Tinea versicolor   • Urinary frequency   • Healthcare maintenance   • Numbness around mouth   • Thrush   • Gastroesophageal reflux disease   • Epigastric pain   • Nausea   • Diarrhea       Medical History:  History reviewed. No pertinent past medical history.     Social History:    Social History     Socioeconomic History   • Marital status: Single   Tobacco Use   • Smoking status: Never Smoker   • Smokeless tobacco: Never Used   Vaping Use   • Vaping Use: Never used   Substance and Sexual Activity   • Alcohol use: Never   • Drug use: Never       Family History:   Family History   Problem Relation Age of Onset   • Diabetes Maternal Uncle    • Stroke Maternal Uncle    • Inflammatory bowel disease Paternal Grandfather    • Crohn's disease Paternal Grandfather    • Colon polyps Paternal Grandfather    • Celiac disease Paternal Grandfather        Surgical History:   Past Surgical History:   Procedure Laterality Date   • DENTAL TRAUMA REPAIR (TOOTH REIMPLANTATION)           Current Facility-Administered Medications:   •  lactated ringers infusion 1,000 mL, 1,000 mL, Intravenous, Continuous, Ketan Shea MD  •  lidocaine (XYLOCAINE) 1 % injection 0.5 mL, 0.5 mL, Intradermal, Once PRN, Ketan Shea MD  •  sodium chloride 0.9 % flush 10 mL, 10 mL, Intravenous, PRN, Susanna Eisenberg APRN  •  sodium chloride 0.9 % flush 3 mL, 3 mL, Intravenous, Q12H, Susanna Eisenberg APRN    Allergies: No Known Allergies     The following portions of the patient's history were reviewed by me and updated as appropriate: review of systems, allergies, current medications, past family history, past medical history, past social history, past surgical history and  problem list.    There were no vitals filed for this visit.    PHYSICAL EXAM:    CONSTITUTIONAL:  today's vital signs reviewed by me  GASTROINTESTINAL: abdomen is soft nontender nondistended with normal active bowel sounds, no masses are appreciated    Assessment/ Plan  We will proceed today with EGD and colonoscopy.    Risks and benefits as well as alternatives to endoscopic evaluation were explained to the patient and they voiced understanding and wish to proceed.  These risks include but are not limited to the risk of bleeding, perforation, adverse reaction to sedation, and missed lesions.  The patient was given the opportunity to ask questions prior to the endoscopic procedure.

## 2022-04-21 NOTE — ANESTHESIA PREPROCEDURE EVALUATION
" Anesthesia Evaluation     Patient summary reviewed and Nursing notes reviewed   NPO Solid Status: > 8 hours  NPO Liquid Status: > 2 hours           Airway   Mallampati: II  TM distance: >3 FB  Neck ROM: full  No difficulty expected  Dental      Pulmonary    Cardiovascular         Neuro/Psych  (+) numbness,    GI/Hepatic/Renal/Endo    (+)  GERD,      Musculoskeletal     Abdominal    Substance History      OB/GYN          Other                        Anesthesia Plan    ASA 2     MAC   (I have reviewed the patient's history and chart with the patient, including all pertinent laboratory results and imaging. I have explained the risks of anesthesia including but not limited to dental damage, corneal abrasion, nerve injury, MI, stroke, aspiration, and death. Patient has agreed to proceed.     /74 (BP Location: Left arm, Patient Position: Lying)   Pulse 76   Temp 36.4 °C (97.5 °F) (Temporal)   Resp 16   Ht 180.3 cm (71\")   Wt 92.1 kg (203 lb)   SpO2 98%   BMI 28.31 kg/m²   )  intravenous induction     Anesthetic plan, all risks, benefits, and alternatives have been provided, discussed and informed consent has been obtained with: patient.        CODE STATUS:       "

## 2022-04-25 LAB
LAB AP CASE REPORT: NORMAL
LAB AP CLINICAL INFORMATION: NORMAL
PATH REPORT.FINAL DX SPEC: NORMAL
PATH REPORT.GROSS SPEC: NORMAL

## 2022-05-24 NOTE — PROGRESS NOTES
"Chief Complaint   Patient presents with   • GI Problem           History of Present Illness  Patient is a 26-year-old male who presents today for Follow-up.  He has a history of GERD and diarrhea.  He underwent EGD and colonoscopy April 2022.  EGD was normal.  Colonoscopy was also normal.  Gastric, duodenal, and random colon biopsies were unremarkable.    Patient presents today for follow-up.  He reports overall he is feeling better.  He has had no further nausea or vomiting.  Reflux symptoms have been well controlled.  He denies any abdominal pain.  Diarrhea has resolved and bowels have been moving regularly.     Result Review :       Tissue Pathology Exam (04/21/2022 13:32)   COLONOSCOPY (04/21/2022 13:19)   UPPER GI ENDOSCOPY (04/21/2022 13:20)   Office Visit with Ketan Shea MD (03/08/2022)       Vital Signs:   /76   Pulse 73   Temp 97.5 °F (36.4 °C)   Ht 180.3 cm (71\")   Wt 98.5 kg (217 lb 1.6 oz)   SpO2 96%   BMI 30.28 kg/m²     Body mass index is 30.28 kg/m².     Physical Exam  Vitals reviewed.   Constitutional:       General: He is not in acute distress.     Appearance: He is well-developed.   HENT:      Head: Normocephalic and atraumatic.   Pulmonary:      Effort: Pulmonary effort is normal. No respiratory distress.   Skin:     General: Skin is dry.      Coloration: Skin is not pale.   Neurological:      Mental Status: He is alert and oriented to person, place, and time.   Psychiatric:         Thought Content: Thought content normal.           Assessment and Plan    Diagnoses and all orders for this visit:    1. Gastroesophageal reflux disease, unspecified whether esophagitis present (Primary)    2. Diarrhea, unspecified type    3. Nausea         Discussion  Patient presents today for follow-up after EGD and colonoscopy.  EGD and colonoscopy findings reviewed at today's office visit.  Both endoscopic appearance and biopsies were normal.  Patient reports symptoms have resolved and he is " overall feeling better.  Recommended continued observation and patient instructed to notify the office if symptoms recur.  He may follow-up with our office on an as-needed basis.  He will need a colonoscopy at age 43 for colon cancer screening.          Follow Up   Return if symptoms worsen or fail to improve.       12.8

## 2022-05-26 ENCOUNTER — OFFICE VISIT (OUTPATIENT)
Dept: GASTROENTEROLOGY | Facility: CLINIC | Age: 27
End: 2022-05-26

## 2022-05-26 VITALS
SYSTOLIC BLOOD PRESSURE: 130 MMHG | OXYGEN SATURATION: 96 % | DIASTOLIC BLOOD PRESSURE: 76 MMHG | HEART RATE: 73 BPM | TEMPERATURE: 97.5 F | WEIGHT: 217.1 LBS | BODY MASS INDEX: 30.39 KG/M2 | HEIGHT: 71 IN

## 2022-05-26 DIAGNOSIS — R19.7 DIARRHEA, UNSPECIFIED TYPE: ICD-10-CM

## 2022-05-26 DIAGNOSIS — R11.0 NAUSEA: ICD-10-CM

## 2022-05-26 DIAGNOSIS — K21.9 GASTROESOPHAGEAL REFLUX DISEASE, UNSPECIFIED WHETHER ESOPHAGITIS PRESENT: Primary | ICD-10-CM

## 2022-05-26 PROCEDURE — 99213 OFFICE O/P EST LOW 20 MIN: CPT | Performed by: NURSE PRACTITIONER

## 2025-06-06 ENCOUNTER — APPOINTMENT (OUTPATIENT)
Dept: GENERAL RADIOLOGY | Facility: HOSPITAL | Age: 30
End: 2025-06-06
Payer: COMMERCIAL

## 2025-06-06 ENCOUNTER — APPOINTMENT (OUTPATIENT)
Dept: CT IMAGING | Facility: HOSPITAL | Age: 30
End: 2025-06-06
Payer: COMMERCIAL

## 2025-06-06 ENCOUNTER — HOSPITAL ENCOUNTER (EMERGENCY)
Facility: HOSPITAL | Age: 30
Discharge: HOME | End: 2025-06-06
Attending: EMERGENCY MEDICINE
Payer: COMMERCIAL

## 2025-06-06 VITALS
OXYGEN SATURATION: 99 % | DIASTOLIC BLOOD PRESSURE: 67 MMHG | BODY MASS INDEX: 42 KG/M2 | HEIGHT: 71 IN | TEMPERATURE: 98 F | WEIGHT: 300 LBS | RESPIRATION RATE: 18 BRPM | HEART RATE: 88 BPM | SYSTOLIC BLOOD PRESSURE: 153 MMHG

## 2025-06-06 DIAGNOSIS — R55 VASOVAGAL SYNCOPE: ICD-10-CM

## 2025-06-06 DIAGNOSIS — S02.831A CLOSED FRACTURE OF MEDIAL WALL OF RIGHT ORBIT, INITIAL ENCOUNTER: Primary | ICD-10-CM

## 2025-06-06 LAB
ALBUMIN SERPL-MCNC: 4.1 G/DL (ref 3.5–5.2)
ALBUMIN/GLOB SERPL: 1.3 G/DL
ALP SERPL-CCNC: 75 U/L (ref 39–117)
ALT SERPL W P-5'-P-CCNC: 38 U/L (ref 1–41)
ANION GAP SERPL CALCULATED.3IONS-SCNC: 8.9 MMOL/L (ref 5–15)
AST SERPL-CCNC: 63 U/L (ref 1–40)
BASOPHILS # BLD AUTO: 0.1 10*3/MM3 (ref 0–0.2)
BASOPHILS NFR BLD AUTO: 1.2 % (ref 0–1.5)
BILIRUB SERPL-MCNC: 0.4 MG/DL (ref 0–1.2)
BUN SERPL-MCNC: 14 MG/DL (ref 6–20)
BUN/CREAT SERPL: 10.7 (ref 7–25)
CALCIUM SPEC-SCNC: 9.3 MG/DL (ref 8.6–10.5)
CHLORIDE SERPL-SCNC: 102 MMOL/L (ref 98–107)
CO2 SERPL-SCNC: 28.1 MMOL/L (ref 22–29)
CREAT SERPL-MCNC: 1.31 MG/DL (ref 0.76–1.27)
DEPRECATED RDW RBC AUTO: 40.5 FL (ref 37–54)
EGFRCR SERPLBLD CKD-EPI 2021: 74.6 ML/MIN/1.73
EOSINOPHIL # BLD AUTO: 0.2 10*3/MM3 (ref 0–0.4)
EOSINOPHIL NFR BLD AUTO: 2.5 % (ref 0.3–6.2)
ERYTHROCYTE [DISTWIDTH] IN BLOOD BY AUTOMATED COUNT: 13.1 % (ref 12.3–15.4)
GEN 5 1HR TROPONIN T REFLEX: 9 NG/L
GLOBULIN UR ELPH-MCNC: 3.1 GM/DL
GLUCOSE SERPL-MCNC: 138 MG/DL (ref 65–99)
HCT VFR BLD AUTO: 39.7 % (ref 37.5–51)
HGB BLD-MCNC: 13.3 G/DL (ref 13–17.7)
IMM GRANULOCYTES # BLD AUTO: 0.02 10*3/MM3 (ref 0–0.05)
IMM GRANULOCYTES NFR BLD AUTO: 0.2 % (ref 0–0.5)
LYMPHOCYTES # BLD AUTO: 1.86 10*3/MM3 (ref 0.7–3.1)
LYMPHOCYTES NFR BLD AUTO: 23 % (ref 19.6–45.3)
MCH RBC QN AUTO: 28.4 PG (ref 26.6–33)
MCHC RBC AUTO-ENTMCNC: 33.5 G/DL (ref 31.5–35.7)
MCV RBC AUTO: 84.8 FL (ref 79–97)
MONOCYTES # BLD AUTO: 0.73 10*3/MM3 (ref 0.1–0.9)
MONOCYTES NFR BLD AUTO: 9 % (ref 5–12)
NEUTROPHILS NFR BLD AUTO: 5.17 10*3/MM3 (ref 1.7–7)
NEUTROPHILS NFR BLD AUTO: 64.1 % (ref 42.7–76)
NRBC BLD AUTO-RTO: 0 /100 WBC (ref 0–0.2)
PLATELET # BLD AUTO: 353 10*3/MM3 (ref 140–450)
PMV BLD AUTO: 8.3 FL (ref 6–12)
POTASSIUM SERPL-SCNC: 3.6 MMOL/L (ref 3.5–5.2)
PROT SERPL-MCNC: 7.2 G/DL (ref 6–8.5)
RBC # BLD AUTO: 4.68 10*6/MM3 (ref 4.14–5.8)
SODIUM SERPL-SCNC: 139 MMOL/L (ref 136–145)
TROPONIN T NUMERIC DELTA: -1 NG/L
TROPONIN T SERPL HS-MCNC: 10 NG/L
WBC NRBC COR # BLD AUTO: 8.08 10*3/MM3 (ref 3.4–10.8)

## 2025-06-06 PROCEDURE — 85025 COMPLETE CBC W/AUTO DIFF WBC: CPT | Performed by: PHYSICIAN ASSISTANT

## 2025-06-06 PROCEDURE — 71045 X-RAY EXAM CHEST 1 VIEW: CPT

## 2025-06-06 PROCEDURE — 84484 ASSAY OF TROPONIN QUANT: CPT | Performed by: PHYSICIAN ASSISTANT

## 2025-06-06 PROCEDURE — 93005 ELECTROCARDIOGRAM TRACING: CPT | Performed by: PHYSICIAN ASSISTANT

## 2025-06-06 PROCEDURE — 93010 ELECTROCARDIOGRAM REPORT: CPT | Performed by: STUDENT IN AN ORGANIZED HEALTH CARE EDUCATION/TRAINING PROGRAM

## 2025-06-06 PROCEDURE — 99284 EMERGENCY DEPT VISIT MOD MDM: CPT

## 2025-06-06 PROCEDURE — 70450 CT HEAD/BRAIN W/O DYE: CPT

## 2025-06-06 PROCEDURE — 80053 COMPREHEN METABOLIC PANEL: CPT | Performed by: PHYSICIAN ASSISTANT

## 2025-06-06 PROCEDURE — 70486 CT MAXILLOFACIAL W/O DYE: CPT

## 2025-06-06 PROCEDURE — 36415 COLL VENOUS BLD VENIPUNCTURE: CPT

## 2025-06-06 NOTE — ED TRIAGE NOTES
To ER via EMS from home.  Pt states he was in the bathroom and started choking and passed out.  Unknown what he struck as he fell. Pt has swelling to rt cheek and rt side of upper lip.  Laceration to right cheek.

## 2025-06-06 NOTE — Clinical Note
[facility] EMERGENCY DEPARTMENT  [address] [facility] [address]  Phone: [phone]    Derek Funez was seen and treated in our emergency department on 6/6/2025.  He may return to work on 06/09/2025.         Thank you for choosing [facility].    Kayla Zhu RN

## 2025-06-06 NOTE — ED PROVIDER NOTES
MD ATTESTATION NOTE    SHARED VISIT: This visit was performed by BOTH a physician and an APC. The substantive portion of the medical decision making was performed by this attesting physician who made or approved the management plan and takes responsibility for patient management. All studies in the APC note (if performed) were independently interpreted by me.     The BRIJESH and I have discussed this patient's history, physical exam, and treatment plan.  I have reviewed the documentation and affirm the documentation and agree with the treatment and plan.  The attached note describes my personal findings.      Independent Historians: Patient    A complete HPI/ROS/PMH/PSH/SH/FH are unobtainable due to: None    Chronic or social conditions impacting patient care (social determinants of health): None    Derek Funez is a 29 y.o. male who presents to the ED c/o acute facial injury after fall.  Patient was in the bathroom this morning when he fell and hit his face on the sink.  Patient with headache but denies any vision changes, nausea, vomiting.  Patient not on any anticoagulation.          On exam:  GENERAL: Pleasant cooperative morbidly obese male, alert, no acute distress  SKIN: Warm, dry  HENT: Normocephalic, right periorbital swelling and abrasion with erythema, dried blood at both nares, right upper lip but no malocclusion and no injury to teeth  EYES: no scleral icterus,e marisol without pain  RESPIRATORY: relaxed breathing  NEURO: alert, moves all extremities, follows commands                                                             Labs  Recent Results (from the past 24 hours)   ECG 12 Lead Syncope    Collection Time: 06/06/25  6:35 AM   Result Value Ref Range    QT Interval 364 ms    QTC Interval 455 ms   High Sensitivity Troponin T    Collection Time: 06/06/25  6:45 AM    Specimen: Blood   Result Value Ref Range    HS Troponin T 10 <22 ng/L   Comprehensive Metabolic Panel    Collection Time: 06/06/25  6:45  AM    Specimen: Blood   Result Value Ref Range    Glucose 138 (H) 65 - 99 mg/dL    BUN 14.0 6.0 - 20.0 mg/dL    Creatinine 1.31 (H) 0.76 - 1.27 mg/dL    Sodium 139 136 - 145 mmol/L    Potassium 3.6 3.5 - 5.2 mmol/L    Chloride 102 98 - 107 mmol/L    CO2 28.1 22.0 - 29.0 mmol/L    Calcium 9.3 8.6 - 10.5 mg/dL    Total Protein 7.2 6.0 - 8.5 g/dL    Albumin 4.1 3.5 - 5.2 g/dL    ALT (SGPT) 38 1 - 41 U/L    AST (SGOT) 63 (H) 1 - 40 U/L    Alkaline Phosphatase 75 39 - 117 U/L    Total Bilirubin 0.4 0.0 - 1.2 mg/dL    Globulin 3.1 gm/dL    A/G Ratio 1.3 g/dL    BUN/Creatinine Ratio 10.7 7.0 - 25.0    Anion Gap 8.9 5.0 - 15.0 mmol/L    eGFR 74.6 >60.0 mL/min/1.73   CBC Auto Differential    Collection Time: 06/06/25  6:45 AM    Specimen: Blood   Result Value Ref Range    WBC 8.08 3.40 - 10.80 10*3/mm3    RBC 4.68 4.14 - 5.80 10*6/mm3    Hemoglobin 13.3 13.0 - 17.7 g/dL    Hematocrit 39.7 37.5 - 51.0 %    MCV 84.8 79.0 - 97.0 fL    MCH 28.4 26.6 - 33.0 pg    MCHC 33.5 31.5 - 35.7 g/dL    RDW 13.1 12.3 - 15.4 %    RDW-SD 40.5 37.0 - 54.0 fl    MPV 8.3 6.0 - 12.0 fL    Platelets 353 140 - 450 10*3/mm3    Neutrophil % 64.1 42.7 - 76.0 %    Lymphocyte % 23.0 19.6 - 45.3 %    Monocyte % 9.0 5.0 - 12.0 %    Eosinophil % 2.5 0.3 - 6.2 %    Basophil % 1.2 0.0 - 1.5 %    Immature Grans % 0.2 0.0 - 0.5 %    Neutrophils, Absolute 5.17 1.70 - 7.00 10*3/mm3    Lymphocytes, Absolute 1.86 0.70 - 3.10 10*3/mm3    Monocytes, Absolute 0.73 0.10 - 0.90 10*3/mm3    Eosinophils, Absolute 0.20 0.00 - 0.40 10*3/mm3    Basophils, Absolute 0.10 0.00 - 0.20 10*3/mm3    Immature Grans, Absolute 0.02 0.00 - 0.05 10*3/mm3    nRBC 0.0 0.0 - 0.2 /100 WBC   High Sensitivity Troponin T 1Hr    Collection Time: 06/06/25  7:51 AM    Specimen: Blood   Result Value Ref Range    HS Troponin T 9 <22 ng/L    Troponin T Numeric Delta -1 Abnormal if >/=3 ng/L       Radiology  XR Chest 1 View  Result Date: 6/6/2025  XR CHEST 1 VW-  HISTORY: Male who is 29 years-old,  choking episode  TECHNIQUE: Frontal view of the chest  COMPARISON: None available  FINDINGS: Heart, mediastinum and pulmonary vasculature are unremarkable. No focal pulmonary consolidation, pleural effusion, or pneumothorax. No acute osseous process.      No evidence for acute pulmonary process. Follow-up as clinical indications persist.  This report was finalized on 6/6/2025 8:00 AM by Dr. Boogie Ha M.D on Workstation: [id number]      CT Head Without Contrast  CT Head Without Contrast, CT Facial Bones Without Contrast  Result Date: 6/6/2025  CT HEAD WO CONTRAST-, CT FACIAL BONES WO CONTRAST-  HISTORY:  fall w loc  COMPARISON: None  CT HEAD WITHOUT CONTRAST: The brain and ventricles are symmetrical. There is no evidence of intracranial hemorrhage, hydrocephalus, fracture or of abnormal extra-axial fluid. No focal area of decreased attenuation to suggest acute infarction or contusion is appreciated.  CT FACIAL BONES WITHOUT CONTRAST: The final sinuses are clear. There is a blow in fracture of the medial orbital wall on the right. The depressed fracture measures approximately 9 mm in the AP dimension and orbital fat extends through the defect. The medial rectus extends slightly through the defect. There is minimal intraorbital stranding interposed between the medial rectus muscle and the fracture.. Clinical correlation is recommended. The globes appear unremarkable. Periapical abscesses are appreciated involving tooth #6, 7 and 8. Mild periorbital stranding is noted on the right.  The above information was called to and discussed with Michael Moffett.    Radiation dose reduction techniques were utilized, including automated exposure modulation based on body size.  This report was finalized on 6/6/2025 7:50 AM by Dr. Orville Vo M.D on Workstation: [id number]        Medical Decision Making:  ED Course as of 06/06/25 0954   Fri Jun 06, 2025   0620 Patient presents after syncopal episode prior to arrival.  Patient  reports having a choking episode and passing out briefly.  Patient hit his right infraorbital area on a sink.  He is neurologically intact.  No obvious ocular entrapment.  Plan for syncope workup, CT imaging of the face and head.  Suspect likely vasovagal reaction. [EE]   0700 EKG independently interpreted myself.  Time 0635.  Sinus rhythm, 94 bpm.  Normal P/KALLIE.  QRS normal with normal axis.  mild ST elevation consistent with early repolarization.  No previous for comparison. [EE]   0702 WBC: 8.08 [EE]   0702 Hemoglobin: 13.3 [EE]   0721 Creatinine(!): 1.31 [EE]   0733 CT facial bones independently interpreted myself show no evidence of intracranial hemorrhage. [EE]   0800 I discussed CT findings with Dr. Vo.  Patient does have a medial orbital wall fracture.  Reexamination shows no hyphema or entrapment.  I will discuss with oculoplastics. [EE]   0924 The patient on workup.  He is not orthostatic.  He states he is feeling better.  No evidence of ischemia.  We will have him follow-up with oculoplastics. [EE]      ED Course User Index  [EE] Michael Moffett PA       MDM: Pt here with injury to face after fall. Plan ct imaging.    This patient presents with chest pain that is more atypical in nature, and most likely secondary to non-cardiac causes. The differential diagnosis includes emergent causes like ACS, PE, pericarditis, myocarditis, thoracic aortic dissection, pneumothorax, pulmonary effusion, and pneumonia. It also includes more benign causes like bronchitis, gerd, esophageal spasm, anxiety/panic, pleurisy, costochondritis/chest wall pain, and biliary colic. Plan to order CXR, serum labs including screening troponin, and EKG while monitoring patient. Will reassess.        Procedures:  Procedures        PPE: I followed hospital protocols for proper PPE based on patient presentation including use of N95 mask for suspected infectious respiratory conditions.  Proper hand hygiene was performed both before and  after the patient encounter.          Diagnosis  Final diagnoses:   Closed fracture of medial wall of right orbit, initial encounter   Vasovagal syncope       Note Disclaimer: At [facility], we believe that sharing information builds trust and better relationships. You are receiving this note because you recently visited [facility]. It is possible you will see health information before a provider has talked with you about it. This kind of information can be easy to misunderstand. To help you fully understand what it means for your health, we urge you to discuss this note with your provider.       Lois Toro MD  06/06/25 0950

## 2025-06-06 NOTE — ED PROVIDER NOTES
EMERGENCY DEPARTMENT ENCOUNTER    Room Number:  07/07  Date of encounter:  6/6/2025  PCP: Lois Bishop MD  Historian: Patient  Chronic or social conditions impacting care (social determinants of health): None    HPI:  Chief Complaint: Headache  A complete HPI/ROS/PMH/PSH/SH/FH are unobtainable due to: None    Context: Derek Funez is a 29 y.o. male with history of obesity, who presents to the ED c/o acute head injury post syncope.  Patient states that he was choking on some chips that he was eating and ran into the bathroom.  While in the bathroom he became lightheaded and lost consciousness.  Patient hit his right infraorbital area on the sink.  He awoke minutes later.  He denies any chest pain, shortness of breath.  He is never passed out before.  Denies any concerning heart history.    Review of prior external notes (non-ED):   Reviewed urgent care office visit dated 12/20/2024.  Patient seen for URI symptoms.    Review of prior external test results outside of this encounter:  I reviewed a CMP dated 1/19/2022.  Creatinine 1.21, potassium 4.1    PAST MEDICAL HISTORY  Active Ambulatory Problems     Diagnosis Date Noted    Tinea versicolor 10/02/2020    Urinary frequency 06/15/2021    Healthcare maintenance 12/06/2021    Numbness around mouth 12/06/2021    Thrush 12/06/2021    Gastroesophageal reflux disease 03/08/2022    Epigastric pain 03/08/2022    Nausea 03/08/2022    Diarrhea 03/08/2022     Resolved Ambulatory Problems     Diagnosis Date Noted    No Resolved Ambulatory Problems     No Additional Past Medical History         PAST SURGICAL HISTORY  Past Surgical History:   Procedure Laterality Date    COLONOSCOPY N/A 4/21/2022    Procedure: COLONOSCOPY;  Surgeon: Ketan Shea MD;  Location: [facility] [place] MAIN OR;  Service: Gastroenterology;  Laterality: N/A;  normal    DENTAL TRAUMA REPAIR (TOOTH REIMPLANTATION)      ENDOSCOPY N/A 4/21/2022    Procedure: ESOPHAGOGASTRODUODENOSCOPY;  Surgeon: Shabbir  Ketan SALMON MD;  Location: [facility] [place] MAIN OR;  Service: Gastroenterology;  Laterality: N/A;  normal         FAMILY HISTORY  Family History   Problem Relation Age of Onset    Diabetes Maternal Uncle     Stroke Maternal Uncle     Inflammatory bowel disease Paternal Grandfather     Crohn's disease Paternal Grandfather     Colon polyps Paternal Grandfather     Celiac disease Paternal Grandfather     Ulcerative colitis Neg Hx     Colon cancer Neg Hx          SOCIAL HISTORY  Social History     Socioeconomic History    Marital status: Single   Tobacco Use    Smoking status: Never    Smokeless tobacco: Never   Vaping Use    Vaping status: Never Used   Substance and Sexual Activity    Alcohol use: Never    Drug use: Never    Sexual activity: Defer         ALLERGIES  Patient has no known allergies.        REVIEW OF SYSTEMS  All systems reviewed and negative except for those discussed in HPI.       PHYSICAL EXAM    I have reviewed the triage vital signs and nursing notes.    ED Triage Vitals   Temp Heart Rate Resp BP SpO2   06/06/25 0546 06/06/25 0546 06/06/25 0546 06/06/25 0546 06/06/25 0546   98 °F (36.7 °C) 100 18 138/83 98 %      Temp src Heart Rate Source Patient Position BP Location FiO2 (%)   06/06/25 0546 06/06/25 0601 06/06/25 0601 06/06/25 0601 --   Oral Monitor Lying Right arm        Physical Exam  GENERAL: Obese, not distressed  HENT: Abrasion with mild tenderness and swelling to the right infraorbital area.  No deformity.  No obvious dental or nasal injury.  There is bruising of the right upper lip.  EYES: Extraocular movements intact, no hyphema  CV: regular rhythm, regular rate, no murmur  RESPIRATORY: normal effort, CTA  ABDOMEN: soft, nontender  MUSCULOSKELETAL: no deformity, FROM, no calf swelling or tenderness  NEURO: alert, moves all extremities, follows commands, cranial nerve deficits, motor, sensation intact  SKIN: warm, dry,         LAB RESULTS  Recent Results (from the past 24 hours)   ECG 12 Lead Syncope     Collection Time: 06/06/25  6:35 AM   Result Value Ref Range    QT Interval 364 ms    QTC Interval 455 ms   High Sensitivity Troponin T    Collection Time: 06/06/25  6:45 AM    Specimen: Blood   Result Value Ref Range    HS Troponin T 10 <22 ng/L   Comprehensive Metabolic Panel    Collection Time: 06/06/25  6:45 AM    Specimen: Blood   Result Value Ref Range    Glucose 138 (H) 65 - 99 mg/dL    BUN 14.0 6.0 - 20.0 mg/dL    Creatinine 1.31 (H) 0.76 - 1.27 mg/dL    Sodium 139 136 - 145 mmol/L    Potassium 3.6 3.5 - 5.2 mmol/L    Chloride 102 98 - 107 mmol/L    CO2 28.1 22.0 - 29.0 mmol/L    Calcium 9.3 8.6 - 10.5 mg/dL    Total Protein 7.2 6.0 - 8.5 g/dL    Albumin 4.1 3.5 - 5.2 g/dL    ALT (SGPT) 38 1 - 41 U/L    AST (SGOT) 63 (H) 1 - 40 U/L    Alkaline Phosphatase 75 39 - 117 U/L    Total Bilirubin 0.4 0.0 - 1.2 mg/dL    Globulin 3.1 gm/dL    A/G Ratio 1.3 g/dL    BUN/Creatinine Ratio 10.7 7.0 - 25.0    Anion Gap 8.9 5.0 - 15.0 mmol/L    eGFR 74.6 >60.0 mL/min/1.73   CBC Auto Differential    Collection Time: 06/06/25  6:45 AM    Specimen: Blood   Result Value Ref Range    WBC 8.08 3.40 - 10.80 10*3/mm3    RBC 4.68 4.14 - 5.80 10*6/mm3    Hemoglobin 13.3 13.0 - 17.7 g/dL    Hematocrit 39.7 37.5 - 51.0 %    MCV 84.8 79.0 - 97.0 fL    MCH 28.4 26.6 - 33.0 pg    MCHC 33.5 31.5 - 35.7 g/dL    RDW 13.1 12.3 - 15.4 %    RDW-SD 40.5 37.0 - 54.0 fl    MPV 8.3 6.0 - 12.0 fL    Platelets 353 140 - 450 10*3/mm3    Neutrophil % 64.1 42.7 - 76.0 %    Lymphocyte % 23.0 19.6 - 45.3 %    Monocyte % 9.0 5.0 - 12.0 %    Eosinophil % 2.5 0.3 - 6.2 %    Basophil % 1.2 0.0 - 1.5 %    Immature Grans % 0.2 0.0 - 0.5 %    Neutrophils, Absolute 5.17 1.70 - 7.00 10*3/mm3    Lymphocytes, Absolute 1.86 0.70 - 3.10 10*3/mm3    Monocytes, Absolute 0.73 0.10 - 0.90 10*3/mm3    Eosinophils, Absolute 0.20 0.00 - 0.40 10*3/mm3    Basophils, Absolute 0.10 0.00 - 0.20 10*3/mm3    Immature Grans, Absolute 0.02 0.00 - 0.05 10*3/mm3    nRBC 0.0 0.0 - 0.2  /100 WBC   High Sensitivity Troponin T 1Hr    Collection Time: 06/06/25  7:51 AM    Specimen: Blood   Result Value Ref Range    HS Troponin T 9 <22 ng/L    Troponin T Numeric Delta -1 Abnormal if >/=3 ng/L       Ordered the above labs and independently reviewed the results.        RADIOLOGY  XR Chest 1 View  Result Date: 6/6/2025  XR CHEST 1 VW-  HISTORY: Male who is 29 years-old, choking episode  TECHNIQUE: Frontal view of the chest  COMPARISON: None available  FINDINGS: Heart, mediastinum and pulmonary vasculature are unremarkable. No focal pulmonary consolidation, pleural effusion, or pneumothorax. No acute osseous process.      No evidence for acute pulmonary process. Follow-up as clinical indications persist.  This report was finalized on 6/6/2025 8:00 AM by Dr. Boogie Ha M.D on Workstation: [employer]      CT Head Without Contrast  CT Head Without Contrast, CT Facial Bones Without Contrast  Result Date: 6/6/2025  CT HEAD WO CONTRAST-, CT FACIAL BONES WO CONTRAST-  HISTORY:  fall w loc  COMPARISON: None  CT HEAD WITHOUT CONTRAST: The brain and ventricles are symmetrical. There is no evidence of intracranial hemorrhage, hydrocephalus, fracture or of abnormal extra-axial fluid. No focal area of decreased attenuation to suggest acute infarction or contusion is appreciated.  CT FACIAL BONES WITHOUT CONTRAST: The final sinuses are clear. There is a blow in fracture of the medial orbital wall on the right. The depressed fracture measures approximately 9 mm in the AP dimension and orbital fat extends through the defect. The medial rectus extends slightly through the defect. There is minimal intraorbital stranding interposed between the medial rectus muscle and the fracture.. Clinical correlation is recommended. The globes appear unremarkable. Periapical abscesses are appreciated involving tooth #6, 7 and 8. Mild periorbital stranding is noted on the right.  The above information was called to and discussed with  Michael Moffett.    Radiation dose reduction techniques were utilized, including automated exposure modulation based on body size.  This report was finalized on 6/6/2025 7:50 AM by Dr. Orville Vo M.D on Workstation: [id number]        I ordered the above noted radiological studies. Reviewed by me and discussed with radiologist.  See dictation for official radiology interpretation.      MEDICATIONS GIVEN IN ER    Medications - No data to display      ADDITIONAL ORDERS CONSIDERED BUT NOT ORDERED:  Admission was considered but after careful review of the patient's presentation, physical examination, diagnostic results, and response to treatment the patient may be safely discharged with outpatient follow-up.       PROGRESS, DATA ANALYSIS, CONSULTS, AND MEDICAL DECISION MAKING    All labs have been independently interpreted by myself.  All radiology studies have been independently interpreted by myself and discussed with radiologist dictating the report.   EKGs independently interpreted by myself.  Discussion below represents my analysis of pertinent findings related to patient's condition, differential diagnosis, treatment plan and final disposition.    I have discussed case with Dr. Toro, emergency room physician.  She has performed her own bedside examination and agrees with treatment plan.    ED Course as of 06/06/25 1628   Fri Jun 06, 2025   0620 Patient presents after syncopal episode prior to arrival.  Patient reports having a choking episode and passing out briefly.  Patient hit his right infraorbital area on a sink.  He is neurologically intact.  No obvious ocular entrapment.  Plan for syncope workup, CT imaging of the face and head.  Suspect likely vasovagal reaction. [EE]   0700 EKG independently interpreted myself.  Time 0635.  Sinus rhythm, 94 bpm.  Normal P/KALLIE.  QRS normal with normal axis.  mild ST elevation consistent with early repolarization.  No previous for comparison. [EE]   0702 WBC: 8.08 [EE]    0702 Hemoglobin: 13.3 [EE]   0721 Creatinine(!): 1.31 [EE]   0733 CT facial bones independently interpreted myself show no evidence of intracranial hemorrhage. [EE]   0800 I discussed CT findings with Dr. Vo.  Patient does have a medial orbital wall fracture.  Reexamination shows no hyphema or entrapment.  I will discuss with oculoplastics. [EE]   0924 The patient on workup.  He is not orthostatic.  He states he is feeling better.  No evidence of ischemia.  We will have him follow-up with oculoplastics. [EE]      ED Course User Index  [EE] Michael Moffett PA       AS OF 16:28 EDT VITALS:    BP - 153/67  HR - 88  TEMP - 98 °F (36.7 °C)  O2 SATS - 99%        DIAGNOSIS  Final diagnoses:   Closed fracture of medial wall of right orbit, initial encounter   Vasovagal syncope         DISPOSITION  Discharged    Admission was considered but after careful review of the patient's presentation, physical examination, diagnostic results, and response to treatment the patient may be safely discharged with outpatient follow-up.         Dictated utilizing Dragon dictation     Michael Moffett PA  06/06/25 2158

## 2025-06-07 LAB
QT INTERVAL: 364 MS
QTC INTERVAL: 455 MS

## 2025-06-13 ENCOUNTER — TELEPHONE (OUTPATIENT)
Dept: FAMILY MEDICINE CLINIC | Facility: CLINIC | Age: 30
End: 2025-06-13

## (undated) DEVICE — VIAL FORMLN CAP 10PCT 20ML

## (undated) DEVICE — Device

## (undated) DEVICE — GOWN ,SIRUS,NONREINFORCED 3XL: Brand: MEDLINE

## (undated) DEVICE — KT ORCA ORCAPOD DISP STRL

## (undated) DEVICE — SINGLE-USE BIOPSY FORCEPS: Brand: RADIAL JAW 4

## (undated) DEVICE — GOWN ISOL W/THUMB UNIV BLU BX/15

## (undated) DEVICE — CANN NASL CO2 TRULINK W/O2 A/

## (undated) DEVICE — BITEBLOCK OMNI BLOC

## (undated) DEVICE — FLEX ADVANTAGE 1500CC: Brand: FLEX ADVANTAGE